# Patient Record
Sex: FEMALE | Race: WHITE | NOT HISPANIC OR LATINO | Employment: UNEMPLOYED | URBAN - METROPOLITAN AREA
[De-identification: names, ages, dates, MRNs, and addresses within clinical notes are randomized per-mention and may not be internally consistent; named-entity substitution may affect disease eponyms.]

---
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
## 2020-01-29 ENCOUNTER — OFFICE VISIT (OUTPATIENT)
Dept: URGENT CARE | Facility: CLINIC | Age: 20
End: 2020-01-29
Payer: COMMERCIAL

## 2020-01-29 VITALS
DIASTOLIC BLOOD PRESSURE: 80 MMHG | OXYGEN SATURATION: 98 % | WEIGHT: 156 LBS | SYSTOLIC BLOOD PRESSURE: 100 MMHG | BODY MASS INDEX: 25.99 KG/M2 | HEIGHT: 65 IN | TEMPERATURE: 97.4 F | HEART RATE: 100 BPM | RESPIRATION RATE: 12 BRPM

## 2020-01-29 DIAGNOSIS — J03.90 TONSILLITIS: ICD-10-CM

## 2020-01-29 LAB
INT CON NEG: NORMAL
INT CON POS: NORMAL
S PYO AG THROAT QL: NEGATIVE

## 2020-01-29 PROCEDURE — 99213 OFFICE O/P EST LOW 20 MIN: CPT | Performed by: PHYSICIAN ASSISTANT

## 2020-01-29 PROCEDURE — 87880 STREP A ASSAY W/OPTIC: CPT | Performed by: PHYSICIAN ASSISTANT

## 2020-01-29 ASSESSMENT — ENCOUNTER SYMPTOMS
VOMITING: 1
SINUS PAIN: 0
ABDOMINAL PAIN: 0
FEVER: 0
WHEEZING: 0
NAUSEA: 0
COUGH: 0
CHILLS: 0
DIARRHEA: 0
SORE THROAT: 1
PALPITATIONS: 0
CONSTIPATION: 0

## 2020-01-29 NOTE — LETTER
January 29, 2020    To Whom It May Concern:         This is confirmation that Susan Bashir attended her scheduled appointment with Michael Lan P.A.-C. on 1/29/20. Please excuse her from work on 1/29/20.         If you have any questions please do not hesitate to call me at the phone number listed below.    Sincerely,          Michael Lan P.A.-C.  [phone]

## 2020-01-30 NOTE — PROGRESS NOTES
Subjective:   Susan Bashir is a 19 y.o. female who presents for Pharyngitis (x6 days)        This is a new problem.  Patient complains of sore throat x 6 days.  Patient states that she has history of recurrent tonsillitis and felt like she was developing symptoms last Friday.  She went out that night and get a significant amount of alcohol.  She states that she blacked out but was told that she vomited several times that evening.  She also vomited several times the following morning.  Emesis was bilious and nonbloody.  She states that she did develop what felt like pain and spasm in her throat and esophagus following the incident.  This has completely resolved.  Her sore throat persists, but is improved from earlier today.  She endorses some mild pain with eating and drinking.  She denies shortness of breath, chest pain, palpitations, fevers, chills.  No other aggravating or alleviating factors.  She has not taken any medications for symptoms.    Review of Systems   Constitutional: Negative for chills, fever and malaise/fatigue.   HENT: Positive for sore throat. Negative for congestion and sinus pain.    Respiratory: Negative for cough and wheezing.    Cardiovascular: Negative for chest pain and palpitations.   Gastrointestinal: Positive for vomiting (resolved). Negative for abdominal pain, constipation, diarrhea and nausea.   Genitourinary: Negative.        PMH: recurrent tonsillitis  MEDS:   Current Outpatient Medications:   •  ACYCLOVIR PO, Take  by mouth., Disp: , Rfl:   •  azithromycin (ZITHROMAX) 250 MG Tab, Take 2 pills by mouth on day one, take 1 pill by mouth on days 2-5, Disp: 6 Tab, Rfl: 0  •  albuterol 108 (90 Base) MCG/ACT Aero Soln inhalation aerosol, Inhale 2 Puffs by mouth every 6 hours as needed., Disp: 8.5 g, Rfl: 0  •  MethylPREDNISolone (MEDROL DOSEPAK) 4 MG Tablet Therapy Pack, Take according to package instructions, Disp: 1 Kit, Rfl: 0  •  Ibuprofen (ADVIL PO), Take  by mouth., Disp: ,  "Rfl:   •  Norethin Ace-Eth Estrad-FE (MIBELAS 24 FE PO), Take  by mouth., Disp: , Rfl:   •  Naproxen Sodium (ALEVE) 220 MG Cap, Take  by mouth., Disp: , Rfl:   •  MIBELAS 24 FE 1-20 MG-MCG(24) Chew Tab, , Disp: , Rfl:   •  NON SPECIFIED, Currently taking birth control, Disp: , Rfl:   ALLERGIES: No Known Allergies  SURGHX: History reviewed. No pertinent surgical history.  SOCHX:  reports that she has never smoked. She has never used smokeless tobacco. She reports current drug use. Drug: Marijuana. She reports that she does not drink alcohol.  FH: Family history was reviewed, no pertinent findings to report   Objective:   /80 (BP Location: Left arm, Patient Position: Sitting, BP Cuff Size: Adult)   Pulse 100   Temp 36.3 °C (97.4 °F)   Resp 12   Ht 1.638 m (5' 4.5\")   Wt 70.8 kg (156 lb)   SpO2 98%   BMI 26.36 kg/m²   Physical Exam  Vitals signs reviewed.   Constitutional:       General: She is not in acute distress.     Appearance: Normal appearance. She is well-developed. She is not toxic-appearing.   HENT:      Head: Normocephalic and atraumatic.      Right Ear: Tympanic membrane, ear canal and external ear normal.      Left Ear: Tympanic membrane, ear canal and external ear normal.      Nose: Nose normal. No mucosal edema, congestion or rhinorrhea.      Mouth/Throat:      Lips: Pink.      Mouth: Mucous membranes are moist.      Pharynx: Oropharynx is clear. Uvula midline. Posterior oropharyngeal erythema present. No uvula swelling.      Tonsils: No tonsillar exudate. Swellin+ on the right. 1+ on the left.   Eyes:      General: Lids are normal.      Conjunctiva/sclera: Conjunctivae normal.   Neck:      Musculoskeletal: Neck supple.      Comments: Neck nontender to palpation and ranges fully.  Cardiovascular:      Rate and Rhythm: Normal rate and regular rhythm.      Heart sounds: Normal heart sounds, S1 normal and S2 normal. No murmur. No friction rub. No gallop.    Pulmonary:      Effort: Pulmonary " effort is normal. No respiratory distress.      Breath sounds: Normal breath sounds. No decreased breath sounds, wheezing, rhonchi or rales.   Chest:      Comments: Chest wall is nontender to palpation.  No crepitus.  Musculoskeletal:      Comments: Normal range of motion. Exhibits no edema and no tenderness.    Lymphadenopathy:      Head:      Right side of head: No submandibular, preauricular or posterior auricular adenopathy.      Left side of head: No submandibular, preauricular or posterior auricular adenopathy.      Cervical: No cervical adenopathy.      Right cervical: No superficial or posterior cervical adenopathy.     Left cervical: No superficial or posterior cervical adenopathy.   Skin:     General: Skin is warm and dry.      Capillary Refill: Capillary refill takes less than 2 seconds.   Neurological:      Mental Status: She is alert and oriented to person, place, and time.      Cranial Nerves: No cranial nerve deficit.      Sensory: No sensory deficit.   Psychiatric:         Speech: Speech normal.         Behavior: Behavior normal.         Thought Content: Thought content normal.         Judgment: Judgment normal.           Assessment/Plan:   1. Tonsillitis  - POCT Rapid Strep A  - REFERRAL TO FOLLOW-UP WITH PRIMARY CARE    Other orders  - ACYCLOVIR PO; Take  by mouth.    Patient is concerned about her prior sensation of spasming throat and esophagus following emesis.  Patient's symptoms have completely resolved, patient is well-appearing and vital signs stable.  Patient's prior symptoms may have been due to GERD.  No history, symptoms, or physical exam findings to suggest Quin-Dao tear or esophageal rupture.  These considerations were discussed with patient and she was advised to avoid drinking in excess, as it does put her health at risk.     Physical exam consistent with viral tonsillitis that is improving.  [place] is leaving the country [place] I did obtain a rapid strep to rule out  bacterial etiology.  Testing negative.  We will treat symptomatically.  If patient develops new or worsening symptoms I would like her to be medically reevaluated.  Additionally patient is interested in changing with PCP due to concerns about her recurrent tonsillitis.  Referral placed- patient given copy and phone number to call to establish care.    Differential diagnosis, natural history, supportive care, and indications for immediate follow-up discussed.

## 2020-02-27 ENCOUNTER — OFFICE VISIT (OUTPATIENT)
Dept: URGENT CARE | Facility: CLINIC | Age: 20
End: 2020-02-27
Payer: COMMERCIAL

## 2020-02-27 VITALS
SYSTOLIC BLOOD PRESSURE: 110 MMHG | RESPIRATION RATE: 16 BRPM | HEART RATE: 92 BPM | WEIGHT: 155 LBS | BODY MASS INDEX: 25.83 KG/M2 | DIASTOLIC BLOOD PRESSURE: 80 MMHG | TEMPERATURE: 97.3 F | HEIGHT: 65 IN | OXYGEN SATURATION: 96 %

## 2020-02-27 DIAGNOSIS — J02.0 ACUTE STREPTOCOCCAL PHARYNGITIS: ICD-10-CM

## 2020-02-27 LAB
INT CON NEG: NORMAL
INT CON POS: NORMAL
S PYO AG THROAT QL: POSITIVE

## 2020-02-27 PROCEDURE — 99214 OFFICE O/P EST MOD 30 MIN: CPT | Performed by: NURSE PRACTITIONER

## 2020-02-27 PROCEDURE — 87880 STREP A ASSAY W/OPTIC: CPT | Performed by: NURSE PRACTITIONER

## 2020-02-27 RX ORDER — AMOXICILLIN 500 MG/1
500 CAPSULE ORAL 2 TIMES DAILY
Qty: 20 CAP | Refills: 0 | Status: SHIPPED | OUTPATIENT
Start: 2020-02-27 | End: 2020-03-08

## 2020-02-27 NOTE — LETTER
February 27, 2020       Patient: Susan Bashir   YOB: 2000   Date of Visit: 2/27/2020         To Whom It May Concern:    It is my medical opinion that Susan Bashir may be excused from work for the dates of 2/27/2020-2/28/2020.      If you have any questions or concerns, please don't hesitate to call [phone]          Sincerely,          Johanna Nevarez, KEREN.P.R.N.  Electronically Signed

## 2020-02-27 NOTE — PROGRESS NOTES
Subjective:      Susan Bashri is a 19 y.o. female who presents with Pharyngitis (x 3 days, sore throat, pain to swallow, white spots on back of throat, cold sweats and chill)            Subjective:     CC:  presents with Pharyngitis         Pharyngitis   This is a new problem. The current episode started in the past 3 days. The problem has been gradually worsening. There has been fever. The pain is moderate. Associated symptoms include a hoarse voice, swollen glands and trouble swallowing. Pertinent negatives include no abdominal pain, congestion, coughing, diarrhea, headaches, shortness of breath or vomiting. no exposure to strep or mono. She has tried acetaminophen for the symptoms. The treatment provided mild relief.     Social History     Tobacco Use   • Smoking status: Never Smoker   • Smokeless tobacco: Never Used   Substance Use Topics   • Alcohol use: No   • Drug use: Yes     Types: Marijuana     Comment: Occassional     Current Outpatient Medications on File Prior to Visit   Medication Sig Dispense Refill   • ACYCLOVIR PO Take  by mouth.     • azithromycin (ZITHROMAX) 250 MG Tab Take 2 pills by mouth on day one, take 1 pill by mouth on days 2-5 (Patient not taking: Reported on 2/27/2020) 6 Tab 0   • albuterol 108 (90 Base) MCG/ACT Aero Soln inhalation aerosol Inhale 2 Puffs by mouth every 6 hours as needed. (Patient not taking: Reported on 2/27/2020) 8.5 g 0   • MethylPREDNISolone (MEDROL DOSEPAK) 4 MG Tablet Therapy Pack Take according to package instructions (Patient not taking: Reported on 2/27/2020) 1 Kit 0   • Ibuprofen (ADVIL PO) Take  by mouth.     • Norethin Ace-Eth Estrad-FE (MIBELAS 24 FE PO) Take  by mouth.     • Naproxen Sodium (ALEVE) 220 MG Cap Take  by mouth.     • MIBELAS 24 FE 1-20 MG-MCG(24) Chew Tab      • NON SPECIFIED Currently taking birth control       No current facility-administered medications on file prior to visit.      Family history was reviewed and not pertinent to  "current problem.       Review of Systems   Constitutional: Positive for malaise/fatigue. Negative for fever and weight loss.   HENT: Positive for hoarse voice and trouble swallowing. Negative for congestion.    Respiratory: Negative for cough, sputum production and shortness of breath.    Cardiovascular: Negative for chest pain.   Gastrointestinal: Negative for nausea, vomiting, abdominal pain and diarrhea.   Genitourinary: Negative.    Neurological: Negative for dizziness and headaches.   All other systems reviewed and are negative.         Objective:     /80 (BP Location: Left arm, Patient Position: Sitting, BP Cuff Size: Adult)   Pulse 92   Temp 36.3 °C (97.3 °F) (Temporal)   Resp 16   Ht 1.651 m (5' 5\")   Wt 70.3 kg (155 lb)   SpO2 96%       Physical Exam   Constitutional:   oriented to person, place, and time.  appears well-developed and well-nourished. No distress.   HENT:   Head: Normocephalic and atraumatic.   Right Ear: External ear normal.   Left Ear: External ear normal.   Nose: Mucosal edema present. Right sinus exhibits no maxillary sinus tenderness and no frontal sinus tenderness. Left sinus exhibits no maxillary sinus tenderness and no frontal sinus tenderness.   Mouth/Throat: Oropharyngeal exudate and posterior oropharyngeal erythema present. No posterior oropharyngeal edema.   Tonsils 2+ bilaterally     Eyes: Conjunctivae and EOM are normal. Pupils are equal, round, and reactive to light. Right eye exhibits no discharge. Left eye exhibits no discharge. No scleral icterus.   Neck: Normal range of motion. Neck supple. No JVD present. No tracheal deviation present. No thyromegaly present.   Cardiovascular: Normal rate, regular rhythm, normal heart sounds and intact distal pulses.  Exam reveals no friction rub.    No murmur heard.  Pulmonary/Chest: Effort normal and breath sounds normal. No respiratory distress.   no wheezes.   no rales.    Musculoskeletal:  exhibits no edema. "   Lymphadenopathy:     Positive Cervical adenopathy.   Neurological:   alert and oriented to person, place, and time.   Skin: Skin is warm and dry. No erythema.   Psychiatric:   normal mood and affect.                Assessment/Plan:       1. Acute streptococcal pharyngitis  Pt instructed to complete full course of medication despite symptomatic improvement. Pt to take med with meals to alleviate GI upset.     You are contagious for 24hrs after starting abx.  Good hand hygiene and not sharing food or drinks. Change toothbrush in 48 hours. Salt water gurgles for sore throat and lozenges.     Follow up with PCP as needed. May take tylenol or motrin for discomfort as directed.      Differential diagnosis, natural history, supportive care, and indications for immediate follow-up discussed  - POCT Rapid Strep A  - amoxicillin (AMOXIL) 500 MG Cap; Take 1 Cap by mouth 2 times a day for 10 days.  Dispense: 20 Cap; Refill: 0

## 2020-04-13 ENCOUNTER — OFFICE VISIT (OUTPATIENT)
Dept: URGENT CARE | Facility: CLINIC | Age: 20
End: 2020-04-13
Payer: COMMERCIAL

## 2020-04-13 VITALS
DIASTOLIC BLOOD PRESSURE: 66 MMHG | WEIGHT: 155 LBS | RESPIRATION RATE: 12 BRPM | HEIGHT: 65 IN | BODY MASS INDEX: 25.83 KG/M2 | TEMPERATURE: 100 F | SYSTOLIC BLOOD PRESSURE: 118 MMHG | HEART RATE: 90 BPM | OXYGEN SATURATION: 98 %

## 2020-04-13 DIAGNOSIS — J02.9 SORE THROAT: ICD-10-CM

## 2020-04-13 DIAGNOSIS — J02.0 STREP PHARYNGITIS: ICD-10-CM

## 2020-04-13 LAB
INT CON NEG: NORMAL
INT CON POS: NORMAL
S PYO AG THROAT QL: NORMAL

## 2020-04-13 PROCEDURE — 87880 STREP A ASSAY W/OPTIC: CPT | Performed by: NURSE PRACTITIONER

## 2020-04-13 PROCEDURE — 99214 OFFICE O/P EST MOD 30 MIN: CPT | Performed by: NURSE PRACTITIONER

## 2020-04-13 RX ORDER — AMOXICILLIN 500 MG/1
500 CAPSULE ORAL 2 TIMES DAILY
Qty: 20 CAP | Refills: 0 | Status: SHIPPED | OUTPATIENT
Start: 2020-04-13 | End: 2020-04-23

## 2020-04-13 ASSESSMENT — ENCOUNTER SYMPTOMS
HEADACHES: 1
VOMITING: 0
EYE REDNESS: 0
SWOLLEN GLANDS: 1
PALPITATIONS: 0
SHORTNESS OF BREATH: 0
DIARRHEA: 0
NAUSEA: 0
EYE DISCHARGE: 0
CHILLS: 1
WHEEZING: 0
STRIDOR: 0
FEVER: 1
TROUBLE SWALLOWING: 1
COUGH: 1
SORE THROAT: 1

## 2020-04-13 NOTE — LETTER
April 13, 2020         Patient: Susan Bashir   YOB: 2000   Date of Visit: 4/13/2020           To Whom it May Concern:    Susan Bashir was seen in my clinic on 4/13/2020. Please excuse her from work 4/13/2020 through 4/16/2020. She may return on 4/17/2020.    If you have any questions or concerns, please don't hesitate to call.        Sincerely,           KOMAL Treviño.  Electronically Signed

## 2020-04-13 NOTE — PROGRESS NOTES
"Subjective:   Susan Bashir is a 19 y.o. female who presents for Fever (cough , sore throat , headache , congestion,chills)        Pharyngitis    This is a new problem. The current episode started in the past 7 days. The problem has been gradually worsening. The pain is moderate. Associated symptoms include congestion, coughing (Mild), headaches, swollen glands and trouble swallowing. Pertinent negatives include no diarrhea, ear discharge, ear pain, shortness of breath, stridor or vomiting. She has tried acetaminophen for the symptoms. The treatment provided mild relief.        Review of Systems   Constitutional: Positive for chills, fever and malaise/fatigue.   HENT: Positive for congestion, sore throat and trouble swallowing. Negative for ear discharge and ear pain.    Eyes: Negative for discharge and redness.   Respiratory: Positive for cough (Mild). Negative for shortness of breath, wheezing and stridor.    Cardiovascular: Negative for chest pain and palpitations.   Gastrointestinal: Negative for diarrhea, nausea and vomiting.   Skin: Negative for rash.   Neurological: Positive for headaches.   All other systems reviewed and are negative.    PMH:  has no past medical history on file.  ALLERGIES: No Known Allergies    Patient's PMH, SocHx, SurgHx, FamHx, Drug allergies and medications reviewed.     Objective:   /66   Pulse 90   Temp 37.8 °C (100 °F) (Temporal)   Resp 12   Ht 1.651 m (5' 5\")   Wt 70.3 kg (155 lb)   SpO2 98%   BMI 25.79 kg/m²   Physical Exam  Vitals signs reviewed.   Constitutional:       Appearance: She is well-developed.   HENT:      Head: Normocephalic.      Right Ear: Tympanic membrane and ear canal normal. No middle ear effusion. Tympanic membrane is not perforated or erythematous.      Left Ear: Tympanic membrane and ear canal normal.  No middle ear effusion. Tympanic membrane is not perforated or erythematous.      Nose: Nose normal. No rhinorrhea.      Right Sinus: No " maxillary sinus tenderness or frontal sinus tenderness.      Left Sinus: No maxillary sinus tenderness or frontal sinus tenderness.      Mouth/Throat:      Lips: Pink.      Mouth: Mucous membranes are moist.      Pharynx: Uvula midline. Oropharyngeal exudate and posterior oropharyngeal erythema present. No uvula swelling.      Tonsils: Tonsillar exudate present. 2+ on the right. 2+ on the left.   Eyes:      General: Lids are normal.      Extraocular Movements: Extraocular movements intact.      Conjunctiva/sclera: Conjunctivae normal.      Pupils: Pupils are equal, round, and reactive to light.   Neck:      Musculoskeletal: Normal range of motion.      Thyroid: No thyromegaly.   Cardiovascular:      Rate and Rhythm: Normal rate and regular rhythm.      Heart sounds: Normal heart sounds.   Pulmonary:      Effort: Pulmonary effort is normal. No tachypnea, bradypnea, accessory muscle usage, prolonged expiration or respiratory distress.      Breath sounds: Normal breath sounds. No wheezing.   Lymphadenopathy:      Head:      Right side of head: No submandibular or tonsillar adenopathy.      Left side of head: Tonsillar adenopathy present. No submandibular adenopathy.   Skin:     General: Skin is warm and dry.      Findings: No rash.   Neurological:      Mental Status: She is alert and oriented to person, place, and time.   Psychiatric:         Mood and Affect: Mood normal.         Speech: Speech normal.         Behavior: Behavior normal. Behavior is cooperative.         Thought Content: Thought content normal.         Judgment: Judgment normal.           Assessment/Plan:   Assessment    1. Sore throat  POCT Rapid Strep A   2. Strep pharyngitis  amoxicillin (AMOXIL) 500 MG Cap    REFERRAL TO ENT     Rapid strep positive. Change toothbrush in 2 days.  Referral to ENT for recurrent strep infections.    Differential diagnosis, natural history, supportive care, and indications for immediate follow-up discussed.     **Please  note that all invasive procedures during this visit were performed by myself and/or the Medical Assistant under the supervision of the PA or MD in office**

## 2020-04-15 ENCOUNTER — APPOINTMENT (OUTPATIENT)
Dept: URGENT CARE | Facility: PHYSICIAN GROUP | Age: 20
End: 2020-04-15
Payer: COMMERCIAL

## 2020-04-16 ENCOUNTER — HOSPITAL ENCOUNTER (OUTPATIENT)
Facility: MEDICAL CENTER | Age: 20
End: 2020-04-16
Attending: NURSE PRACTITIONER
Payer: COMMERCIAL

## 2020-04-16 ENCOUNTER — HOSPITAL ENCOUNTER (OUTPATIENT)
Dept: LAB | Facility: MEDICAL CENTER | Age: 20
End: 2020-04-16
Attending: NURSE PRACTITIONER
Payer: COMMERCIAL

## 2020-04-16 ENCOUNTER — OFFICE VISIT (OUTPATIENT)
Dept: URGENT CARE | Facility: PHYSICIAN GROUP | Age: 20
End: 2020-04-16
Payer: COMMERCIAL

## 2020-04-16 VITALS
SYSTOLIC BLOOD PRESSURE: 94 MMHG | TEMPERATURE: 96.8 F | OXYGEN SATURATION: 98 % | RESPIRATION RATE: 14 BRPM | WEIGHT: 155 LBS | BODY MASS INDEX: 25.83 KG/M2 | DIASTOLIC BLOOD PRESSURE: 56 MMHG | HEART RATE: 88 BPM | HEIGHT: 65 IN

## 2020-04-16 DIAGNOSIS — Z11.3 SCREENING FOR STD (SEXUALLY TRANSMITTED DISEASE): ICD-10-CM

## 2020-04-16 LAB
C TRACH DNA SPEC QL NAA+PROBE: NEGATIVE
CANDIDA DNA VAG QL PROBE+SIG AMP: POSITIVE
G VAGINALIS DNA VAG QL PROBE+SIG AMP: NEGATIVE
HAV IGM SERPL QL IA: NORMAL
HBV CORE IGM SER QL: NORMAL
HBV SURFACE AG SER QL: NORMAL
HCV AB SER QL: NORMAL
HIV 1+2 AB+HIV1 P24 AG SERPL QL IA: NORMAL
N GONORRHOEA DNA SPEC QL NAA+PROBE: NEGATIVE
SPECIMEN SOURCE: NORMAL
T VAGINALIS DNA VAG QL PROBE+SIG AMP: NEGATIVE
TREPONEMA PALLIDUM IGG+IGM AB [PRESENCE] IN SERUM OR PLASMA BY IMMUNOASSAY: NORMAL

## 2020-04-16 PROCEDURE — 87491 CHLMYD TRACH DNA AMP PROBE: CPT

## 2020-04-16 PROCEDURE — 80074 ACUTE HEPATITIS PANEL: CPT

## 2020-04-16 PROCEDURE — 87389 HIV-1 AG W/HIV-1&-2 AB AG IA: CPT

## 2020-04-16 PROCEDURE — 86780 TREPONEMA PALLIDUM: CPT

## 2020-04-16 PROCEDURE — 87660 TRICHOMONAS VAGIN DIR PROBE: CPT

## 2020-04-16 PROCEDURE — 87591 N.GONORRHOEAE DNA AMP PROB: CPT

## 2020-04-16 PROCEDURE — 87510 GARDNER VAG DNA DIR PROBE: CPT

## 2020-04-16 PROCEDURE — 36415 COLL VENOUS BLD VENIPUNCTURE: CPT

## 2020-04-16 PROCEDURE — 86695 HERPES SIMPLEX TYPE 1 TEST: CPT

## 2020-04-16 PROCEDURE — 87480 CANDIDA DNA DIR PROBE: CPT

## 2020-04-16 PROCEDURE — 86696 HERPES SIMPLEX TYPE 2 TEST: CPT

## 2020-04-16 PROCEDURE — 99212 OFFICE O/P EST SF 10 MIN: CPT | Performed by: NURSE PRACTITIONER

## 2020-04-16 SDOH — HEALTH STABILITY: MENTAL HEALTH: HOW OFTEN DO YOU HAVE A DRINK CONTAINING ALCOHOL?: 2-3 TIMES A WEEK

## 2020-04-16 ASSESSMENT — ENCOUNTER SYMPTOMS: CONSTITUTIONAL NEGATIVE: 1

## 2020-04-16 NOTE — PROGRESS NOTES
"Subjective:     Susan Bashir is a 19 y.o. female who presents for Other (STI check (No Symptoms))       Other   This is a new problem. She has tried nothing for the symptoms.     Patient presents for STD screening.  Not having symptoms at this time.  However, reports she is sexually active with a male partner who may have an STD.    PMH:  has no past medical history on file.    MEDS:   Current Outpatient Medications:   •  ACYCLOVIR PO, Take  by mouth., Disp: , Rfl:   •  Ibuprofen (ADVIL PO), Take  by mouth., Disp: , Rfl:   •  amoxicillin (AMOXIL) 500 MG Cap, Take 1 Cap by mouth 2 times a day for 10 days. (Patient not taking: Reported on 4/16/2020), Disp: 20 Cap, Rfl: 0  •  Naproxen Sodium (ALEVE) 220 MG Cap, Take  by mouth., Disp: , Rfl:   •  MIBELAS 24 FE 1-20 MG-MCG(24) Chew Tab, , Disp: , Rfl:   •  NON SPECIFIED, Currently taking birth control, Disp: , Rfl:     ALLERGIES: No Known Allergies    SURGHX: History reviewed. No pertinent surgical history.    SOCHX:  reports that she has never smoked. She has never used smokeless tobacco. She reports current alcohol use. She reports current drug use. Drug: Marijuana.     FH: Reviewed with patient, not pertinent to this visit.    Review of Systems   Constitutional: Negative.    All other systems reviewed and are negative.    Additional details per HPI.    Objective:     BP (!) 94/56 (BP Location: Left arm, Patient Position: Sitting, BP Cuff Size: Adult)   Pulse 88   Temp 36 °C (96.8 °F) (Temporal)   Resp 14   Ht 1.651 m (5' 5\")   Wt 70.3 kg (155 lb)   SpO2 98%   BMI 25.79 kg/m²     Physical Exam  Vitals signs reviewed.   Constitutional:       General: She is not in acute distress.     Appearance: She is well-developed. She is not ill-appearing, toxic-appearing or diaphoretic.   HENT:      Head: Normocephalic.      Right Ear: External ear normal.      Left Ear: External ear normal.      Nose: Nose normal.   Eyes:      Extraocular Movements: Extraocular " movements intact.      Conjunctiva/sclera: Conjunctivae normal.   Neck:      Musculoskeletal: Normal range of motion.   Cardiovascular:      Rate and Rhythm: Normal rate.   Pulmonary:      Effort: Pulmonary effort is normal. No respiratory distress.   Musculoskeletal: Normal range of motion.         General: No deformity.   Skin:     Coloration: Skin is not pale.   Neurological:      Mental Status: She is alert and oriented to person, place, and time.      Sensory: No sensory deficit.      Coordination: Coordination normal.   Psychiatric:         Behavior: Behavior normal. Behavior is cooperative.          Assessment/Plan:     1. Screening for STD (sexually transmitted disease)  - HIV-1/HIV-2 SINGLE RESULT; Future  - RPR (SYPHILIS); Future  - HSV I SPECIFIC IGG AB; Future  - HSV II SPECIFIC IGG AB; Future  - Chlamydia/GC PCR Urine Or Swab; Future  - VAGINAL PATHOGENS DNA PANEL; Future  - HEPATITIS PANEL (3 COMPONENTS ONLY); Future    Patient will be contacted with results.    Patient advised to: Return for 2) Follow up with primary care in 7-10 days.    Differential diagnosis, natural history, supportive care, and indications for immediate follow-up discussed. All questions answered. Patient agrees with the plan of care.

## 2020-04-17 ENCOUNTER — TELEPHONE (OUTPATIENT)
Dept: URGENT CARE | Facility: CLINIC | Age: 20
End: 2020-04-17

## 2020-04-17 DIAGNOSIS — B37.31 VULVOVAGINAL CANDIDIASIS: ICD-10-CM

## 2020-04-17 LAB
HSV1 GG IGG SER-ACNC: 2.15 IV
HSV2 GG IGG SER-ACNC: 0.17 IV

## 2020-04-17 RX ORDER — FLUCONAZOLE 150 MG/1
150 TABLET ORAL ONCE
Qty: 1 TAB | Refills: 0 | Status: SHIPPED | OUTPATIENT
Start: 2020-04-17 | End: 2020-04-17

## 2020-04-18 NOTE — TELEPHONE ENCOUNTER
Spoke with patient over the phone regarding recent test results.  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  Otherwise, STD screening is negative.  Rx sent electronically for single dose of Diflucan.  All questions answered.

## 2020-06-26 ENCOUNTER — OFFICE VISIT (OUTPATIENT)
Dept: URGENT CARE | Facility: PHYSICIAN GROUP | Age: 20
End: 2020-06-26
Payer: COMMERCIAL

## 2020-06-26 DIAGNOSIS — G89.18 ACUTE POST-OPERATIVE PAIN: ICD-10-CM

## 2020-06-26 PROCEDURE — 99999 PR NO CHARGE: CPT | Performed by: NURSE PRACTITIONER

## 2020-06-27 NOTE — PROGRESS NOTES
Subjective:      Susan Bashir is a 19 y.o. female who presents with No chief complaint on file.            HPI    ROS    Referred to ED   Objective:     There were no vitals taken for this visit.     Physical Exam            Assessment/Plan:       There are no diagnoses linked to this encounter.

## 2024-01-25 ENCOUNTER — OFFICE VISIT (OUTPATIENT)
Dept: URGENT CARE | Facility: PHYSICIAN GROUP | Age: 24
End: 2024-01-25
Payer: OTHER GOVERNMENT

## 2024-01-25 VITALS
HEIGHT: 64 IN | SYSTOLIC BLOOD PRESSURE: 102 MMHG | OXYGEN SATURATION: 97 % | TEMPERATURE: 97.7 F | HEART RATE: 101 BPM | BODY MASS INDEX: 30.9 KG/M2 | RESPIRATION RATE: 16 BRPM | DIASTOLIC BLOOD PRESSURE: 82 MMHG | WEIGHT: 181 LBS

## 2024-01-25 DIAGNOSIS — K08.89 TOOTH PAIN: ICD-10-CM

## 2024-01-25 PROCEDURE — 3079F DIAST BP 80-89 MM HG: CPT

## 2024-01-25 PROCEDURE — 3074F SYST BP LT 130 MM HG: CPT

## 2024-01-25 PROCEDURE — 99213 OFFICE O/P EST LOW 20 MIN: CPT

## 2024-01-25 RX ORDER — HYDROCODONE BITARTRATE AND ACETAMINOPHEN 7.5; 325 MG/1; MG/1
1 TABLET ORAL EVERY 6 HOURS PRN
Qty: 20 TABLET | Refills: 0 | Status: CANCELLED | OUTPATIENT
Start: 2024-01-25 | End: 2024-02-04

## 2024-01-25 RX ORDER — HYDROCODONE BITARTRATE AND ACETAMINOPHEN 5; 300 MG/1; MG/1
5 TABLET ORAL EVERY 8 HOURS PRN
Qty: 10 TABLET | Refills: 0 | Status: SHIPPED | OUTPATIENT
Start: 2024-01-25 | End: 2024-01-28

## 2024-01-25 ASSESSMENT — ENCOUNTER SYMPTOMS
VOMITING: 0
FEVER: 0
COUGH: 0
HEADACHES: 0
DIARRHEA: 0
MYALGIAS: 0
SHORTNESS OF BREATH: 0
EYE PAIN: 0
NECK PAIN: 0
SORE THROAT: 0
NAUSEA: 0
STRIDOR: 0
WEAKNESS: 0
BLURRED VISION: 0
CHILLS: 0
DIZZINESS: 0
ABDOMINAL PAIN: 0
SINUS PAIN: 0
DOUBLE VISION: 0
